# Patient Record
Sex: FEMALE | Race: WHITE | Employment: FULL TIME | URBAN - METROPOLITAN AREA
[De-identification: names, ages, dates, MRNs, and addresses within clinical notes are randomized per-mention and may not be internally consistent; named-entity substitution may affect disease eponyms.]

---

## 2017-10-04 ENCOUNTER — HOSPITAL ENCOUNTER (OUTPATIENT)
Dept: MAMMOGRAPHY | Facility: CLINIC | Age: 62
Discharge: HOME | End: 2017-10-04
Attending: FAMILY MEDICINE | Admitting: FAMILY MEDICINE
Payer: COMMERCIAL

## 2017-10-04 ENCOUNTER — HOSPITAL ENCOUNTER (OUTPATIENT)
Dept: MAMMOGRAPHY | Facility: CLINIC | Age: 62
End: 2017-10-04
Attending: FAMILY MEDICINE
Payer: COMMERCIAL

## 2017-10-04 DIAGNOSIS — R92.8 ABNORMAL MAMMOGRAM: ICD-10-CM

## 2017-10-04 PROCEDURE — G0204 DX MAMMO INCL CAD BI: HCPCS

## 2017-10-04 PROCEDURE — 76642 ULTRASOUND BREAST LIMITED: CPT | Mod: RT

## 2017-10-06 ENCOUNTER — HOSPITAL ENCOUNTER (OUTPATIENT)
Dept: MAMMOGRAPHY | Facility: CLINIC | Age: 62
Discharge: HOME | End: 2017-10-06
Attending: FAMILY MEDICINE | Admitting: FAMILY MEDICINE
Payer: COMMERCIAL

## 2017-10-06 ENCOUNTER — HOSPITAL ENCOUNTER (OUTPATIENT)
Dept: MAMMOGRAPHY | Facility: CLINIC | Age: 62
End: 2017-10-06
Attending: FAMILY MEDICINE
Payer: COMMERCIAL

## 2017-10-06 DIAGNOSIS — N63.10 MASS OF RIGHT BREAST: ICD-10-CM

## 2017-10-06 DIAGNOSIS — R92.8 ABNORMAL MAMMOGRAM: ICD-10-CM

## 2017-10-06 PROCEDURE — 25000125 ZZHC RX 250: Performed by: FAMILY MEDICINE

## 2017-10-06 PROCEDURE — 88305 TISSUE EXAM BY PATHOLOGIST: CPT | Mod: 26 | Performed by: RADIOLOGY

## 2017-10-06 PROCEDURE — 88305 TISSUE EXAM BY PATHOLOGIST: CPT | Performed by: RADIOLOGY

## 2017-10-06 PROCEDURE — 40000986 MA POST PROCEDURE RIGHT

## 2017-10-06 PROCEDURE — 27210206 US BREAST BIOPSY CORE NEEDLE RIGHT

## 2017-10-06 RX ADMIN — LIDOCAINE HYDROCHLORIDE 5 ML: 10 INJECTION, SOLUTION INFILTRATION; PERINEURAL at 13:58

## 2017-10-06 NOTE — DISCHARGE INSTRUCTIONS
After Your Breast Biopsy    Bleeding or bruising: Slight bruising is normal.  If you bleed through the bandage, put direct pressure on the breast.  If you are still bleeding after 20 minutes, call the doctor who ordered the exam.    Bandages: Keep your bandage in place until tomorrow morning.  Do not get it wet.  Leave the tape in place for two days.  On the second day, cover it with a Band-Aid.    Activity: You may shower the morning after the exam.  No heavy activity (lifting, vacuuming) for 24 hours.    Discomfort: Wear your bra overnight to support the breast.  You may take Tylenol (acetaminophen) for pain.  If you had a stereotactic of MR-directed biopsy, you may take aspirin or ibuprofen (Advil, Motrin) the morning after your biopsy, unless your doctor tells you not to.    Infection: Infection is rare.  Symptoms include fever, redness, increasing pain and fluid draining from the biopsy site.  If you have any of these symptoms, please call the doctor who ordered your exam.    Results: Results may take up to three business days.  If you have not heard your results in three days, call the Breast Center Nurse at [phone] or [phone].  In rare cases, we may need to do another biopsy.    Call the doctor who ordered your exam if:    You have bleeding that lasts more than 20 minutes.    You have pain that cannot be controlled.    You have signs of infection (fever, redness, drainage or other signs).    You have not had your results within three days.    Nurse navigator: Our nurse navigator is here to answer your questions and help you set up future clinic visits.  Please call [phone].    Thank you for choosing [facility].  Please call us if you have questions or concerns about your biopsy.

## 2017-10-09 LAB — COPATH REPORT: NORMAL

## 2017-10-09 NOTE — PROGRESS NOTES
After PATHOLOGY review by Breast Center Radiologist, Dr. Montrell Cooley, Ms. Rosas was called and given her 10/6/2017 Right Breast Biopsy results (Fibroadenoma) and recommended Follow up (Annual Screening Mammogram).  Biopsy site is without issues.  I encouraged her to perform monthly breast self exams and to contact her doctor with any further breast changes or concerns.

## 2020-04-28 DIAGNOSIS — Z65.9 PSYCHOSOCIAL PROBLEM: Primary | ICD-10-CM

## 2020-04-28 NOTE — PROGRESS NOTES
Clinical Product Navigator reviewed chart; patient on payer product coverage.  Review results: Obtaining further information to determine needs and next steps. CPN sent CC referral for proactive outreach.     Arianna Cyr, Clinical Product Navigator

## 2020-04-29 ENCOUNTER — PATIENT OUTREACH (OUTPATIENT)
Dept: CARE COORDINATION | Facility: CLINIC | Age: 65
End: 2020-04-29

## 2020-04-29 NOTE — PROGRESS NOTES
Clinic Care Coordination Contact  RUST/Voicemail    Referral Source: Health Plan  Clinical Data: Care Coordinator Outreach  Outreach attempted x 1.  Left message on patient's voicemail with call back information and requested return call.  Plan: Care Coordinator will send care coordination introduction letter with care coordinator contact information and explanation of care coordination services via mail. Care Coordinator will try to reach patient again in 3-5 business days.    GENE Beard  Clinic Care Coordinator   [facility]   [phone]

## 2021-11-30 ENCOUNTER — OFFICE VISIT (OUTPATIENT)
Dept: OBGYN | Facility: CLINIC | Age: 66
End: 2021-11-30
Payer: COMMERCIAL

## 2021-11-30 ENCOUNTER — ANCILLARY PROCEDURE (OUTPATIENT)
Dept: MAMMOGRAPHY | Facility: CLINIC | Age: 66
End: 2021-11-30
Attending: INTERNAL MEDICINE
Payer: COMMERCIAL

## 2021-11-30 VITALS
DIASTOLIC BLOOD PRESSURE: 81 MMHG | BODY MASS INDEX: 21.53 KG/M2 | SYSTOLIC BLOOD PRESSURE: 127 MMHG | HEIGHT: 62 IN | HEART RATE: 77 BPM | WEIGHT: 117 LBS

## 2021-11-30 DIAGNOSIS — Z12.31 VISIT FOR SCREENING MAMMOGRAM: ICD-10-CM

## 2021-11-30 DIAGNOSIS — Z78.0 MENOPAUSE: Primary | ICD-10-CM

## 2021-11-30 PROCEDURE — 77067 SCR MAMMO BI INCL CAD: CPT | Mod: 26 | Performed by: STUDENT IN AN ORGANIZED HEALTH CARE EDUCATION/TRAINING PROGRAM

## 2021-11-30 PROCEDURE — G0463 HOSPITAL OUTPT CLINIC VISIT: HCPCS

## 2021-11-30 PROCEDURE — 99204 OFFICE O/P NEW MOD 45 MIN: CPT | Mod: GC | Performed by: OBSTETRICS & GYNECOLOGY

## 2021-11-30 PROCEDURE — 77067 SCR MAMMO BI INCL CAD: CPT

## 2021-11-30 RX ORDER — BUPROPION HYDROCHLORIDE 150 MG/1
150 TABLET, EXTENDED RELEASE ORAL DAILY
COMMUNITY
Start: 2021-01-15 | End: 2021-12-10

## 2021-11-30 ASSESSMENT — MIFFLIN-ST. JEOR: SCORE: 1017.21

## 2021-11-30 NOTE — PROGRESS NOTES
"[facility]  New Gynecology Visit    Reason for Consult: Menopause Symptoms     SUBJECTIVE     HPI:    April Rosas is a 66 year old  here for new patient visit and evaluation of exacerbation of menopause symptoms.    Patient had COVID in 2020 and feels like this caused significant changes in her health. Prior to this she was healthy and on hormone replacement therapy (Prempro) which she had been on for management of menopause for 20 years. She was also taking Adderall at this time for management of ADD. This really helped to manage her symptoms.  She stopped taking this medication after getting COVID as she was concerned about interaction of COVID and her HRT.  Since this time she has felt like she has decreased energy, hot flashes, issues with concentration, decreased sex drive and libido and states \"my body just seems to have aged\". These symptoms have been worsening and she has not found anything to even mildly resolve her symptoms. Given this, she presents today for management of symptoms. Also requests a mammogram at this time.    GYN History  - LMP: No LMP recorded. Patient is postmenopausal. about 25 years ago  - Pap Smears: no  - Sexual Activity/Concerns: not for past 1 year, previous yes  - Hx STIs/UTIs: denies  - last mammogram  with suspicious lesion,follow up biopsy was negative    Obstetric History  OB History    Para Term  AB Living   2 2 0 0 0 0   SAB IAB Ectopic Multiple Live Births   0 0 0 0 0      # Outcome Date GA Lbr Castillo/2nd Weight Sex Delivery Anes PTL Lv   2 Para            1 Para                Past Medical History  Past Medical History:   Diagnosis Date     ADD (attention deficit disorder)      Anxiety        Past Surgical History  Past Surgical History:   Procedure Laterality Date     MYOMECTOMY         Medications  Current Outpatient Medications   Medication     estrogen conj-medroxyPROGESTERone " "(PREMPRO) 0.45-1.5 MG tablet     buPROPion (WELLBUTRIN SR) 150 MG 12 hr tablet     No current facility-administered medications for this visit.     Allergies   No Known Allergies    Social History  Social History     Tobacco Use     Smoking status: Never Smoker     Smokeless tobacco: Never Used   Substance Use Topics     Alcohol use: Never     Drug use: Never       Family History  No family history on file.      ROS: 10-Point ROS negative except as noted in HPI    OBJECTIVE     Physical Exam  /81   Pulse 77   Ht 1.58 m (5' 2.21\")   Wt 53.1 kg (117 lb)   BMI 21.26 kg/m    Gen: Well-appearing, NAD  HEENT: Normocephalic, atraumatic  Neck: Thyroid is not enlarged, no appreciable masses palpated. Non-tender  CV:  RRR, no m/r/g auscultated  Pulm: CTAB, no w/r/r auscultated  Abd: Soft, non-tender, non-distended    ASSESSMENT & PLAN     April Rosas is a 66 year old  female here for evaluation of recent exacerbation of menopause symptoms. Was previously on Prempro and Adderall for management of menopause/ADD symptoms for several years. Stopped these medications after getting COVID 2020 and has been off them since this time. Menopausal symptoms including hot flashes, decreased libido and sex drive in addition to others have been recently exacerbated. Discussed with patient that one way to address this would be to consider going back on this medication. She is also interested in establishing care with a primary care physician who could also assist with restarting her Adderall in addition to helping her address other health concerns as well.     Plan:     # Menopause  (primary encounter diagnosis)  - Order placed for estrogen conj-medroxyPROGESTERone (PREMPRO) 0.45-1.5 MG tablet and sent to patient pharmacy. We will see if this improves some of the patients current concerns.  - Mammogram ordered at patient request   - Patient would like to schedule primary care visit with provider in our clinic as she has " appreciated her care here. Will schedule visit with Dr. Moscoso to address other health concerns including ADD.    Return to clinic in PRN    Patient seen and evaluated with Dr. Joshi who was present for entirety of visit.     Jakob Hernandez DO, MS  OBGYN Resident, PGY1  [facility]  12/04/2021 2:01 PM     The Patient was seen in Resident Continuity Clinic by JAKOB HERNANDEZ.  I reviewed the history & exam. Assessment and plan were jointly made. I was present for counseling and discussion.     Blanca Joshi MD

## 2021-12-10 ENCOUNTER — LAB (OUTPATIENT)
Dept: LAB | Facility: CLINIC | Age: 66
End: 2021-12-10
Attending: INTERNAL MEDICINE
Payer: COMMERCIAL

## 2021-12-10 ENCOUNTER — OFFICE VISIT (OUTPATIENT)
Dept: INTERNAL MEDICINE | Facility: CLINIC | Age: 66
End: 2021-12-10
Attending: INTERNAL MEDICINE
Payer: COMMERCIAL

## 2021-12-10 VITALS
HEIGHT: 62 IN | HEART RATE: 89 BPM | SYSTOLIC BLOOD PRESSURE: 151 MMHG | BODY MASS INDEX: 21.53 KG/M2 | WEIGHT: 117 LBS | DIASTOLIC BLOOD PRESSURE: 82 MMHG

## 2021-12-10 DIAGNOSIS — U09.9 POST-COVID CHRONIC FATIGUE: ICD-10-CM

## 2021-12-10 DIAGNOSIS — G93.32 POST-COVID CHRONIC FATIGUE: ICD-10-CM

## 2021-12-10 DIAGNOSIS — Z78.0 MENOPAUSE PRESENT: ICD-10-CM

## 2021-12-10 DIAGNOSIS — F33.1 MODERATE EPISODE OF RECURRENT MAJOR DEPRESSIVE DISORDER (H): Primary | ICD-10-CM

## 2021-12-10 DIAGNOSIS — Z12.11 COLON CANCER SCREENING: ICD-10-CM

## 2021-12-10 LAB
ALBUMIN SERPL-MCNC: 3.8 G/DL (ref 3.4–5)
ALP SERPL-CCNC: 76 U/L (ref 40–150)
ALT SERPL W P-5'-P-CCNC: 19 U/L (ref 0–50)
ANION GAP SERPL CALCULATED.3IONS-SCNC: 3 MMOL/L (ref 3–14)
AST SERPL W P-5'-P-CCNC: 16 U/L (ref 0–45)
BASOPHILS # BLD AUTO: 0 10E3/UL (ref 0–0.2)
BASOPHILS NFR BLD AUTO: 0 %
BILIRUB SERPL-MCNC: 0.3 MG/DL (ref 0.2–1.3)
BUN SERPL-MCNC: 11 MG/DL (ref 7–30)
CALCIUM SERPL-MCNC: 9.6 MG/DL (ref 8.5–10.1)
CHLORIDE BLD-SCNC: 108 MMOL/L (ref 94–109)
CHOLEST SERPL-MCNC: 199 MG/DL
CO2 SERPL-SCNC: 28 MMOL/L (ref 20–32)
CREAT SERPL-MCNC: 0.74 MG/DL (ref 0.52–1.04)
CRP SERPL-MCNC: 12 MG/L (ref 0–8)
EOSINOPHIL # BLD AUTO: 0.2 10E3/UL (ref 0–0.7)
EOSINOPHIL NFR BLD AUTO: 3 %
ERYTHROCYTE [DISTWIDTH] IN BLOOD BY AUTOMATED COUNT: 11.9 % (ref 10–15)
FASTING STATUS PATIENT QL REPORTED: NO
GFR SERPL CREATININE-BSD FRML MDRD: 84 ML/MIN/1.73M2
GLUCOSE BLD-MCNC: 96 MG/DL (ref 70–99)
HCT VFR BLD AUTO: 41.5 % (ref 35–47)
HDLC SERPL-MCNC: 88 MG/DL
HGB BLD-MCNC: 13.4 G/DL (ref 11.7–15.7)
IMM GRANULOCYTES # BLD: 0 10E3/UL
IMM GRANULOCYTES NFR BLD: 0 %
LDLC SERPL CALC-MCNC: 98 MG/DL
LYMPHOCYTES # BLD AUTO: 2.1 10E3/UL (ref 0.8–5.3)
LYMPHOCYTES NFR BLD AUTO: 27 %
MCH RBC QN AUTO: 29.6 PG (ref 26.5–33)
MCHC RBC AUTO-ENTMCNC: 32.3 G/DL (ref 31.5–36.5)
MCV RBC AUTO: 92 FL (ref 78–100)
MONOCYTES # BLD AUTO: 0.5 10E3/UL (ref 0–1.3)
MONOCYTES NFR BLD AUTO: 7 %
NEUTROPHILS # BLD AUTO: 4.9 10E3/UL (ref 1.6–8.3)
NEUTROPHILS NFR BLD AUTO: 63 %
NONHDLC SERPL-MCNC: 111 MG/DL
NRBC # BLD AUTO: 0 10E3/UL
NRBC BLD AUTO-RTO: 0 /100
PLATELET # BLD AUTO: 260 10E3/UL (ref 150–450)
POTASSIUM BLD-SCNC: 4.5 MMOL/L (ref 3.4–5.3)
PROT SERPL-MCNC: 7.3 G/DL (ref 6.8–8.8)
RBC # BLD AUTO: 4.52 10E6/UL (ref 3.8–5.2)
SODIUM SERPL-SCNC: 139 MMOL/L (ref 133–144)
TRIGL SERPL-MCNC: 67 MG/DL
TSH SERPL DL<=0.005 MIU/L-ACNC: 1.67 MU/L (ref 0.4–4)
WBC # BLD AUTO: 7.7 10E3/UL (ref 4–11)

## 2021-12-10 PROCEDURE — 80053 COMPREHEN METABOLIC PANEL: CPT

## 2021-12-10 PROCEDURE — 80061 LIPID PANEL: CPT

## 2021-12-10 PROCEDURE — 36415 COLL VENOUS BLD VENIPUNCTURE: CPT

## 2021-12-10 PROCEDURE — 84443 ASSAY THYROID STIM HORMONE: CPT

## 2021-12-10 PROCEDURE — 85025 COMPLETE CBC W/AUTO DIFF WBC: CPT

## 2021-12-10 PROCEDURE — 86140 C-REACTIVE PROTEIN: CPT

## 2021-12-10 PROCEDURE — G0463 HOSPITAL OUTPT CLINIC VISIT: HCPCS

## 2021-12-10 PROCEDURE — 99204 OFFICE O/P NEW MOD 45 MIN: CPT | Performed by: INTERNAL MEDICINE

## 2021-12-10 RX ORDER — BUPROPION HYDROCHLORIDE 150 MG/1
150 TABLET, EXTENDED RELEASE ORAL DAILY
Status: CANCELLED | OUTPATIENT
Start: 2021-12-10

## 2021-12-10 RX ORDER — VENLAFAXINE HYDROCHLORIDE 75 MG/1
75 TABLET, EXTENDED RELEASE ORAL DAILY
Qty: 30 TABLET | Refills: 3 | Status: SHIPPED | OUTPATIENT
Start: 2021-12-10 | End: 2022-01-14

## 2021-12-10 ASSESSMENT — ANXIETY QUESTIONNAIRES
6. BECOMING EASILY ANNOYED OR IRRITABLE: SEVERAL DAYS
3. WORRYING TOO MUCH ABOUT DIFFERENT THINGS: MORE THAN HALF THE DAYS
GAD7 TOTAL SCORE: 10
7. FEELING AFRAID AS IF SOMETHING AWFUL MIGHT HAPPEN: MORE THAN HALF THE DAYS
2. NOT BEING ABLE TO STOP OR CONTROL WORRYING: MORE THAN HALF THE DAYS
5. BEING SO RESTLESS THAT IT IS HARD TO SIT STILL: NOT AT ALL
1. FEELING NERVOUS, ANXIOUS, OR ON EDGE: MORE THAN HALF THE DAYS

## 2021-12-10 ASSESSMENT — MIFFLIN-ST. JEOR: SCORE: 1013.96

## 2021-12-10 ASSESSMENT — PATIENT HEALTH QUESTIONNAIRE - PHQ9
SUM OF ALL RESPONSES TO PHQ QUESTIONS 1-9: 6
5. POOR APPETITE OR OVEREATING: SEVERAL DAYS

## 2021-12-10 ASSESSMENT — PAIN SCALES - GENERAL: PAINLEVEL: NO PAIN (0)

## 2021-12-10 NOTE — PROGRESS NOTES
Assessment & Plan     Moderate episode of recurrent major depressive disorder (H)  Discussed management of major depression with patient. Advised on treatment options, recommend starting Effexor. Patient was also advised on intensive outpatient program as well as counseling. Referrals were placed today.   - venlafaxine (EFFEXOR-ER) 75 MG 24 hr tablet; Take 1 tablet (75 mg) by mouth daily  - Adult Mental Health Referral; Future  - Adult Mental Health Referral; Future    Post-COVID chronic fatigue  Patient was advised on uncertainty of clinical course following COVID-19. Recommend exclusion of common metabolic and endocrine causes, patient will be advised accordingly. Patient was also counseled on impact of depresion on fatigue.   - Comprehensive metabolic panel; Future  - CBC with Platelets Differential; Future  - TSH with free T4 reflex; Future  - Lipid Profile; Future  - C-Reactive Protein, Inflammatory; Future    Menopause present  Recommend DEXA scan.   - Dexa hip/pelvis/spine; Future    Colon cancer screening  Discussed colon cancer screening, patient opted for colonoscopy.   - Adult Gastro Ref - Procedure Only; Future      I spent a total of 45 minutes on the day of the visit.   Time spent doing chart review, history and exam, documentation and further activities per the note           No follow-ups on file.    Jennifer Moscoso MD  [facility] [place]    Carter Chen is a 66 year old who presents for the following health issues     HPI     Patient reports that she has concerns regarding her health. She was working two jobs when she developed COVID-19. She describes her initial illness as cough, which was later complicated by fatigue, poor appetite, loss of smell and taste. She was also dealing with headaches, muscle aches, diarrhea, and abdominal pain. She was not hospitalized. She reports that it took her a long time to recover.   She has stopped the medications she has  "been on prior to COVID-19. She is still taking bupropion.. She reports that she is having issues with fatigue, joint aches, low libido. She is not sure if her symptoms are related to ADHD or effects of COVID. She finds herself loosing focus a lot easier. She has switched jobs after COVID-19. She is currently working night shift, however, she is able to sleep.     Review of Systems   Constitutional, HEENT, cardiovascular, pulmonary, GI, , musculoskeletal, neuro, skin, endocrine and psych systems are negative, except as otherwise noted.      Objective    BP (!) 151/82   Pulse 89   Ht 1.575 m (5' 2\")   Wt 53.1 kg (117 lb)   Breastfeeding No   BMI 21.40 kg/m    Body mass index is 21.4 kg/m .  Physical Exam   GENERAL: healthy, alert and no distress  EYES: Eyes grossly normal to inspection, PERRL and conjunctivae and sclerae normal  NECK: no adenopathy, no asymmetry, masses, or scars and thyroid normal to palpation  RESP: lungs clear to auscultation - no rales, rhonchi or wheezes  CV: regular rate and rhythm, normal S1 S2, no S3 or S4, no murmur, click or rub, no peripheral edema and peripheral pulses strong  ABDOMEN: soft, nontender, no hepatosplenomegaly, no masses and bowel sounds normal  MS: no gross musculoskeletal defects noted, no edema  SKIN: no suspicious lesions or rashes  NEURO: Normal strength and tone, mentation intact and speech normal  PSYCH: mentation appears normal, affect normal/bright                "

## 2021-12-10 NOTE — TELEPHONE ENCOUNTER
Prior Authorization Retail Medication Request    Medication/Dose: venlafaxine (EFFEXOR-ER) 75 MG 24 hr tablet  ICD code (if different than what is on RX):  Moderate episode of recurrent major depressive disorder (H) [F33.1]  Previously Tried and Failed:    Rationale:      Insurance Name: [employer]  Insurance ID:  [id number]    Pharmacy Information (if different than what is on RX)  Name: [facility] #[id number] - [place], [place] - [address] AT [facility] [address]  Phone:  [phone]

## 2021-12-11 ASSESSMENT — ANXIETY QUESTIONNAIRES: GAD7 TOTAL SCORE: 10

## 2021-12-14 NOTE — TELEPHONE ENCOUNTER
Central Prior Authorization Team   Phone: [phone]      PA Initiation    Medication: venlafaxine (EFFEXOR-ER) 75 MG 24 hr tablet  Insurance Company: [employer] - Phone [phone] Fax [phone]  Pharmacy Filling the Rx: [id number] - [place], [place] - [address] AT [facility] [address]  Filling Pharmacy Phone: [phone]  Filling Pharmacy Fax:    Start Date: 12/14/2021

## 2021-12-19 ENCOUNTER — HEALTH MAINTENANCE LETTER (OUTPATIENT)
Age: 66
End: 2021-12-19

## 2021-12-20 NOTE — TELEPHONE ENCOUNTER
Prior Authorization Approval    Authorization Effective Date: 11/14/2021  Authorization Expiration Date: 12/14/2022  Medication: venlafaxine (EFFEXOR-ER) 75 MG 24 hr tablet-APPROVED  Approved Dose/Quantity:   Reference #:     Insurance Company: [employer] - Phone [phone] Fax [phone]  Expected CoPay:       CoPay Card Available:      Foundation Assistance Needed:    Which Pharmacy is filling the prescription (Not needed for infusion/clinic administered): [id number] - [place], [place] - [address] AT [facility] [address]  Pharmacy Notified: Yes  Patient Notified: No

## 2022-01-14 ENCOUNTER — TELEPHONE (OUTPATIENT)
Dept: OBGYN | Facility: CLINIC | Age: 67
End: 2022-01-14
Payer: COMMERCIAL

## 2022-01-14 DIAGNOSIS — F33.1 MODERATE EPISODE OF RECURRENT MAJOR DEPRESSIVE DISORDER (H): ICD-10-CM

## 2022-01-14 RX ORDER — VENLAFAXINE HYDROCHLORIDE 37.5 MG/1
37.5 TABLET, EXTENDED RELEASE ORAL DAILY
Qty: 30 TABLET | Refills: 0 | Status: SHIPPED | OUTPATIENT
Start: 2022-01-14 | End: 2022-03-01

## 2022-01-14 NOTE — TELEPHONE ENCOUNTER
----- Message from Mis Paige RN sent at 1/14/2022 11:12 AM CST -----  Regarding: Side effects from Effexor  Message received from patient stating she is having many side effects from Effexor and would like to know if she should stop/taper off.

## 2022-01-14 NOTE — TELEPHONE ENCOUNTER
Recommend cutting dose in half (new script is sent) and stopping after 1-2 weeks.   Jennifer Moscoso MD

## 2022-01-14 NOTE — TELEPHONE ENCOUNTER
"Nu says that for the past couple of weeks, she has been tired all the time, but doesn't feel like she sleeps very long.  She also feels \"internal twitching\" that she says is similar to restless legs.  This improves if she moves, but feels \"tingly crawly\"  She feels like her arms and legs are weaker.    She would like instructions on how to wean off effexor.  Missed last visit with Dr Moscoso.    Routed to Dr Moscoso to advise.  "

## 2022-01-24 NOTE — TELEPHONE ENCOUNTER
Tried to reach Nu,  but received voicemail.  Left message with plan.  To call back if additional questions

## 2022-02-02 ENCOUNTER — OFFICE VISIT (OUTPATIENT)
Dept: FAMILY MEDICINE | Facility: CLINIC | Age: 67
End: 2022-02-02
Attending: FAMILY MEDICINE
Payer: COMMERCIAL

## 2022-02-02 VITALS
SYSTOLIC BLOOD PRESSURE: 135 MMHG | HEART RATE: 78 BPM | WEIGHT: 113.5 LBS | HEIGHT: 62 IN | DIASTOLIC BLOOD PRESSURE: 79 MMHG | BODY MASS INDEX: 20.89 KG/M2

## 2022-02-02 DIAGNOSIS — R79.82 ELEVATED C-REACTIVE PROTEIN (CRP): Primary | ICD-10-CM

## 2022-02-02 DIAGNOSIS — F32.1 CURRENT MODERATE EPISODE OF MAJOR DEPRESSIVE DISORDER, UNSPECIFIED WHETHER RECURRENT (H): ICD-10-CM

## 2022-02-02 DIAGNOSIS — L82.1 SEBORRHEIC KERATOSES: ICD-10-CM

## 2022-02-02 DIAGNOSIS — R59.9 ENLARGED LYMPH NODES: ICD-10-CM

## 2022-02-02 LAB
CRP SERPL-MCNC: <2.9 MG/L (ref 0–8)
ERYTHROCYTE [SEDIMENTATION RATE] IN BLOOD BY WESTERGREN METHOD: 9 MM/HR (ref 0–30)

## 2022-02-02 PROCEDURE — 36415 COLL VENOUS BLD VENIPUNCTURE: CPT | Performed by: FAMILY MEDICINE

## 2022-02-02 PROCEDURE — G0463 HOSPITAL OUTPT CLINIC VISIT: HCPCS

## 2022-02-02 PROCEDURE — 99203 OFFICE O/P NEW LOW 30 MIN: CPT | Performed by: FAMILY MEDICINE

## 2022-02-02 PROCEDURE — 85652 RBC SED RATE AUTOMATED: CPT | Performed by: FAMILY MEDICINE

## 2022-02-02 PROCEDURE — 86140 C-REACTIVE PROTEIN: CPT | Performed by: FAMILY MEDICINE

## 2022-02-02 ASSESSMENT — MIFFLIN-ST. JEOR: SCORE: 998.08

## 2022-02-02 NOTE — LETTER
"2/2/2022       RE: April Rosas  [address]  [place] [address]     Dear Colleague,    Thank you for referring your patient, April Rosas, to the [facility]. Please see a copy of my visit note below.      Assessment & Plan   Problem List Items Addressed This Visit     None      Visit Diagnoses     Elevated C-reactive protein (CRP)    -  Primary    Relevant Orders    RBC Sedimentation Rate (Completed)    C-Reactive Protein, Inflammatory (Completed)    Current moderate episode of major depressive disorder, unspecified whether recurrent (H)        Enlarged lymph nodes        Seborrheic keratoses                 40 minutes spent on the date of the encounter doing chart review, review of test results, interpretation of tests, patient visit and documentation        Return in about 3 weeks (around 2/23/2022) for Follow up Dr. Moscoso.    Doug Hollingsworth MD  [facility]    Carter Judge is a 66 year old who presents for the following health issues     HPI   Follow up of mood and Lab results, patient last seen by Dr. Moscoso on 12/10/2021, note reviewed    1. Recurrent MDD  2. Anxiety    Patient got COVID 2 years ago, not hospitalized, but became faced with mortalitiy, became quite anxious and worried  about potential long term effects and stopped chronic medications including HRT, wellbutrin and ADHD stimulants. She then restarted Wellbutrin. When saw Dr. Moscoso,  this was not relieviing mood  and other symptoms--fatigue, libido, concentration  She was then  started on Effexor 75 mg and referred to both therapy and psychiatry  Since then, describes fidgeting constatnly, \"body restless\" can't sleep  Feeling \"internal\" shivering/twitching, along with dry throat, fatigue  Called nurse line, and instructed to descrease 37.5 mg 5 days ago  Feels unpleasant side effects of " "Effexor have gotten worse since decreasing dose  She has not made any mental health appointments    3. Pain and fatigue follow up  Reviewed labs ordered by Dr. 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     There is no problem list on file for this patient.      Objective    /79   Pulse 78   Ht 1.575 m (5' 2\")   Wt 51.5 kg (113 lb 8 oz)   BMI 20.76 kg/m    Body mass index is 20.76 kg/m .   EXAM:  Constitutional: healthy, alert and no distress   Cardiovascular: negative, PMI normal. No lifts, heaves, or thrills. RRR. No murmurs, clicks gallops or rub  Respiratory: negative, Percussion normal. Good diaphragmatic excursion. Lungs clear  Neck: Carotids without bruits., negative findings: thyroid normal to palpation, positive findings: 5mm mobile nontender R enlarged lymph node, anterior?  Neuro: no tremor or involuntary movements, gait normal  Speech RRR, mood is depressed, anxious, no psychomotor changes, thought process is appropriate,   Affect is normal. No evidence of suicidality.   Skin: multiple pigmented, rough stuck on lesions on abdomen and trunk, consistent with seborrheic keratoses    A/P  1. MDD  Reaction to Effexor; recommend take 37.5 mg dose for rest of week then discontinue.   Counseled patient need to establish care with therapy and psychiatry to optimize medication management; to have staff assist patient in making mental " health appointments  --consider Social work assistance if needed in future    2. R cervical enlarge lymph node  If unchanged or larger at next visit, consider imaging, counseled regarding nature of finding and may rsolve on own    3. Elevated CRP  Counseled on nonspecfic nature of finding, and will repeat, along with ESR  Further evaluation if continues at this level or higher    4. Seborrheic keratosess  Counseled on benign nature, no treatment needed unless irritating.         Follow-up with Dr. Moscoso 3 weeks

## 2022-02-02 NOTE — PROGRESS NOTES
"  Assessment & Plan   Problem List Items Addressed This Visit     None      Visit Diagnoses     Elevated C-reactive protein (CRP)    -  Primary    Relevant Orders    RBC Sedimentation Rate (Completed)    C-Reactive Protein, Inflammatory (Completed)    Current moderate episode of major depressive disorder, unspecified whether recurrent (H)        Enlarged lymph nodes        Seborrheic keratoses                 40 minutes spent on the date of the encounter doing chart review, review of test results, interpretation of tests, patient visit and documentation            Return in about 3 weeks (around 2/23/2022) for Follow up Dr. Moscoso.    Doug Hollingsworth MD  [facility] DANG Judge is a 66 year old who presents for the following health issues     HPI   Follow up of mood and Lab results, patient last seen by Dr. Moscoso on 12/10/2021, note reviewed    1. Recurrent MDD  2. Anxiety    Patient got COVID 2 years ago, not hospitalized, but became faced with mortalitiy, became quite anxious and worried  about potential long term effects and stopped chronic medications including HRT, wellbutrin and ADHD stimulants. She then restarted Wellbutrin. When saw Dr. Moscoso,  this was not relieviing mood  and other symptoms--fatigue, libido, concentration  She was then  started on Effexor 75 mg and referred to both therapy and psychiatry  Since then, describes fidgeting constatnly, \"body restless\" can't sleep  Feeling \"internal\" shivering/twitching, along with dry throat, fatigue  Called nurse line, and instructed to descrease 37.5 mg 5 days ago  Feels unpleasant side effects of Effexor have gotten worse since decreasing dose  She has not made any mental health appointments      3. Pain and fatigue follow up  Reviewed labs ordered by Dr. Moscoso--all within normal limits except CRP 12  No family history autoimmune/rheumatoid disease, father with leukemia  Normal mammogram 11/2021  Patient has known " "OA in hands, baker's cyst  Never smoker    Review of Systems   Concerned about new moles on trunk  Otherwise as noted above, and unchanged from December visit    Current Outpatient Medications   Medication     estrogen conj-medroxyPROGESTERone (PREMPRO) 0.45-1.5 MG tablet     norethindrone-eth estradiol (FEMHRT LOW DOSE) 0.5-2.5 MG-MCG tablet     venlafaxine (EFFEXOR-ER) 37.5 MG 24 hr tablet     No current facility-administered medications for this visit.     There is no problem list on file for this patient.          Objective    /79   Pulse 78   Ht 1.575 m (5' 2\")   Wt 51.5 kg (113 lb 8 oz)   BMI 20.76 kg/m    Body mass index is 20.76 kg/m .   EXAM:  Constitutional: healthy, alert and no distress   Cardiovascular: negative, PMI normal. No lifts, heaves, or thrills. RRR. No murmurs, clicks gallops or rub  Respiratory: negative, Percussion normal. Good diaphragmatic excursion. Lungs clear  Neck: Carotids without bruits., negative findings: thyroid normal to palpation, positive findings: 5mm mobile nontender R enlarged lymph node, anterior?  Neuro: no tremor or involuntary movements, gait normal  Speech RRR, mood is depressed, anxious, no psychomotor changes, thought process is appropriate,   Affect is normal. No evidence of suicidality.   Skin: multiple pigmented, rough stuck on lesions on abdomen and trunk, consistent with seborrheic keratoses    A/P  1. MDD  Reaction to Effexor; recommend take 37.5 mg dose for rest of week then discontinue.   Counseled patient need to establish care with therapy and psychiatry to optimize medication management; to have staff assist patient in making mental health appointments  --consider Social work assistance if needed in future    2. R cervical enlarge lymph node  If unchanged or larger at next visit, consider imaging, counseled regarding nature of finding and may rsolve on own    3. Elevated CRP  Counseled on nonspecfic nature of finding, and will repeat, along with " ESR  Further evaluation if continues at this level or higher    4. Seborrheic keratosess  Counseled on benign nature, no treatment needed unless irritating.         Follow-up with Dr. Moscoso 3 weeks

## 2022-02-13 NOTE — RESULT ENCOUNTER NOTE
Dear Nu,     Here are your recent results. Your markers of inflammation, CRP and the sed rate (ESR) are both normal. It is unclear why the CRP was high previously, but it is now well  in the normal range.    Please let us know if you have any questions or concerns.    Regards,  Doug Hollingsworth MD

## 2022-02-25 DIAGNOSIS — F33.1 MODERATE EPISODE OF RECURRENT MAJOR DEPRESSIVE DISORDER (H): ICD-10-CM

## 2022-03-01 NOTE — TELEPHONE ENCOUNTER
venlafaxine (EFFEXOR-ER) 37.5 MG 24 hr tablet    Take 1 tablet (37.5 mg) by mouth daily -    Last Written Prescription Date:  1/14/22  Last Fill Quantity: 30,   # refills: 0  Last Office Visit : 12/10/21  Future Office visit:  none    Routing refill request to provider for review/approval because:  Per last refill 1/14/22 (telephone encounter) . Recommended to cut dose in half and stopping after 1-2 weeks.  Please advise.     12/10/2021     PHQ-9 Total Score 6

## 2022-03-10 DIAGNOSIS — F33.1 MODERATE EPISODE OF RECURRENT MAJOR DEPRESSIVE DISORDER (H): ICD-10-CM

## 2022-03-11 RX ORDER — VENLAFAXINE HYDROCHLORIDE 37.5 MG/1
37.5 TABLET, EXTENDED RELEASE ORAL DAILY
OUTPATIENT
Start: 2022-03-11

## 2022-03-11 NOTE — TELEPHONE ENCOUNTER
Following message received from Dr. Moscoso:    Dr. Hollingsworth recommend stopping Effexor. Is she still taking it?     Jennifer    Routing comment          Attempted to reach the patient to clarify, left VM to call back to discuss refill request.

## 2022-03-11 NOTE — TELEPHONE ENCOUNTER
venlafaxine (EFFEXOR-ER) 37.5 MG 24 hr tablet  Last Written Prescription Date:  1/14/2022  Last Fill Quantity: 30,   # refills: 0  Last Office Visit :  12/10/2021  Future Office visit:  None    Routing refill request to provider for review/approval because:  Second Request,  Only a 30 day supply sent to pharmacy last order.   Continue same dose.    Pt asked to follow up with [facility] for further refills.     Please advise        Brynn Trotter RN  Central Triage Red Flags/Med Refills

## 2022-04-12 RX ORDER — VENLAFAXINE HYDROCHLORIDE 37.5 MG/1
37.5 TABLET, EXTENDED RELEASE ORAL DAILY
Qty: 90 TABLET | Refills: 1 | Status: SHIPPED | OUTPATIENT
Start: 2022-04-12

## 2022-10-16 ENCOUNTER — HEALTH MAINTENANCE LETTER (OUTPATIENT)
Age: 67
End: 2022-10-16

## 2023-03-26 ENCOUNTER — HEALTH MAINTENANCE LETTER (OUTPATIENT)
Age: 68
End: 2023-03-26

## 2024-03-23 ENCOUNTER — HEALTH MAINTENANCE LETTER (OUTPATIENT)
Age: 69
End: 2024-03-23

## 2024-06-01 ENCOUNTER — HEALTH MAINTENANCE LETTER (OUTPATIENT)
Age: 69
End: 2024-06-01